# Patient Record
Sex: FEMALE | Race: WHITE | Employment: PART TIME | URBAN - METROPOLITAN AREA
[De-identification: names, ages, dates, MRNs, and addresses within clinical notes are randomized per-mention and may not be internally consistent; named-entity substitution may affect disease eponyms.]

---
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
## 2024-12-30 ENCOUNTER — OFFICE VISIT (OUTPATIENT)
Dept: INTERNAL MEDICINE | Age: 19
End: 2024-12-30

## 2024-12-30 VITALS
TEMPERATURE: 98 F | BODY MASS INDEX: 31.23 KG/M2 | OXYGEN SATURATION: 99 % | RESPIRATION RATE: 16 BRPM | HEIGHT: 67 IN | WEIGHT: 199 LBS | SYSTOLIC BLOOD PRESSURE: 116 MMHG | HEART RATE: 75 BPM | DIASTOLIC BLOOD PRESSURE: 74 MMHG

## 2024-12-30 DIAGNOSIS — G43.009 MIGRAINE WITHOUT AURA AND WITHOUT STATUS MIGRAINOSUS, NOT INTRACTABLE: ICD-10-CM

## 2024-12-30 DIAGNOSIS — R35.0 URINARY FREQUENCY: Primary | ICD-10-CM

## 2024-12-30 DIAGNOSIS — N94.6 DYSMENORRHEA: ICD-10-CM

## 2024-12-30 DIAGNOSIS — F32.A DEPRESSION, UNSPECIFIED DEPRESSION TYPE: ICD-10-CM

## 2024-12-30 PROBLEM — N91.1 SECONDARY AMENORRHEA: Status: ACTIVE | Noted: 2024-09-10

## 2024-12-30 PROBLEM — F41.9 ANXIETY: Status: ACTIVE | Noted: 2021-06-15

## 2024-12-30 PROBLEM — R42 DIZZINESS: Status: RESOLVED | Noted: 2021-02-02 | Resolved: 2024-12-30

## 2024-12-30 PROBLEM — K21.9 GASTROESOPHAGEAL REFLUX DISEASE: Status: ACTIVE | Noted: 2021-02-02

## 2024-12-30 PROBLEM — G89.29 CHRONIC BILATERAL LOW BACK PAIN WITHOUT SCIATICA: Status: ACTIVE | Noted: 2019-10-28

## 2024-12-30 PROBLEM — J45.20 INTERMITTENT ASTHMA WITHOUT COMPLICATION: Status: ACTIVE | Noted: 2024-12-30

## 2024-12-30 PROBLEM — M54.50 CHRONIC BILATERAL LOW BACK PAIN WITHOUT SCIATICA: Status: ACTIVE | Noted: 2019-10-28

## 2024-12-30 PROBLEM — R42 DIZZINESS: Status: ACTIVE | Noted: 2021-02-02

## 2024-12-30 PROBLEM — H53.002 AMBLYOPIA, LEFT EYE: Status: ACTIVE | Noted: 2023-08-07

## 2024-12-30 PROBLEM — N39.0 FREQUENT UTI: Status: ACTIVE | Noted: 2021-06-15

## 2024-12-30 PROBLEM — K80.50 BILIARY COLIC: Status: ACTIVE | Noted: 2023-05-22

## 2024-12-30 LAB
BILIRUBIN, POC: NEGATIVE
BLOOD URINE, POC: NEGATIVE
CLARITY, POC: NORMAL
COLOR, POC: YELLOW
CONTROL: NORMAL
GLUCOSE URINE, POC: NEGATIVE MG/DL
KETONES, POC: NEGATIVE MG/DL
LEUKOCYTE EST, POC: NEGATIVE
NITRITE, POC: NEGATIVE
PH, POC: 6
PREGNANCY TEST URINE, POC: NEGATIVE
PROTEIN, POC: NEGATIVE MG/DL
SPECIFIC GRAVITY, POC: 1.01
UROBILINOGEN, POC: NEGATIVE MG/DL

## 2024-12-30 PROCEDURE — 81025 URINE PREGNANCY TEST: CPT | Performed by: STUDENT IN AN ORGANIZED HEALTH CARE EDUCATION/TRAINING PROGRAM

## 2024-12-30 PROCEDURE — 81003 URINALYSIS AUTO W/O SCOPE: CPT | Performed by: STUDENT IN AN ORGANIZED HEALTH CARE EDUCATION/TRAINING PROGRAM

## 2024-12-30 PROCEDURE — 99204 OFFICE O/P NEW MOD 45 MIN: CPT | Performed by: STUDENT IN AN ORGANIZED HEALTH CARE EDUCATION/TRAINING PROGRAM

## 2024-12-30 RX ORDER — ACETAMINOPHEN AND CODEINE PHOSPHATE 120; 12 MG/5ML; MG/5ML
0.35 SOLUTION ORAL DAILY
COMMUNITY
Start: 2024-05-07

## 2024-12-30 RX ORDER — METHYLPHENIDATE HYDROCHLORIDE 36 MG/1
36 TABLET, EXTENDED RELEASE ORAL DAILY
COMMUNITY
Start: 2024-06-19

## 2024-12-30 RX ORDER — NORTRIPTYLINE HYDROCHLORIDE 25 MG/1
25 CAPSULE ORAL NIGHTLY
COMMUNITY
Start: 2024-05-08

## 2024-12-30 RX ORDER — PANTOPRAZOLE SODIUM 40 MG/1
40 TABLET, DELAYED RELEASE ORAL 2 TIMES DAILY
COMMUNITY
Start: 2024-10-31

## 2024-12-30 RX ORDER — RIZATRIPTAN BENZOATE 10 MG/1
10 TABLET ORAL PRN
COMMUNITY
Start: 2024-05-08

## 2024-12-30 SDOH — ECONOMIC STABILITY: INCOME INSECURITY: HOW HARD IS IT FOR YOU TO PAY FOR THE VERY BASICS LIKE FOOD, HOUSING, MEDICAL CARE, AND HEATING?: PATIENT DECLINED

## 2024-12-30 ASSESSMENT — PATIENT HEALTH QUESTIONNAIRE - PHQ9
10. IF YOU CHECKED OFF ANY PROBLEMS, HOW DIFFICULT HAVE THESE PROBLEMS MADE IT FOR YOU TO DO YOUR WORK, TAKE CARE OF THINGS AT HOME, OR GET ALONG WITH OTHER PEOPLE: VERY DIFFICULT
SUM OF ALL RESPONSES TO PHQ QUESTIONS 1-9: 15
SUM OF ALL RESPONSES TO PHQ QUESTIONS 1-9: 15
8. MOVING OR SPEAKING SO SLOWLY THAT OTHER PEOPLE COULD HAVE NOTICED. OR THE OPPOSITE, BEING SO FIGETY OR RESTLESS THAT YOU HAVE BEEN MOVING AROUND A LOT MORE THAN USUAL: NEARLY EVERY DAY
7. TROUBLE CONCENTRATING ON THINGS, SUCH AS READING THE NEWSPAPER OR WATCHING TELEVISION: MORE THAN HALF THE DAYS
6. FEELING BAD ABOUT YOURSELF - OR THAT YOU ARE A FAILURE OR HAVE LET YOURSELF OR YOUR FAMILY DOWN: NEARLY EVERY DAY
9. THOUGHTS THAT YOU WOULD BE BETTER OFF DEAD, OR OF HURTING YOURSELF: NOT AT ALL
SUM OF ALL RESPONSES TO PHQ QUESTIONS 1-9: 15
3. TROUBLE FALLING OR STAYING ASLEEP: NEARLY EVERY DAY
2. FEELING DOWN, DEPRESSED OR HOPELESS: SEVERAL DAYS
4. FEELING TIRED OR HAVING LITTLE ENERGY: SEVERAL DAYS
5. POOR APPETITE OR OVEREATING: MORE THAN HALF THE DAYS
SUM OF ALL RESPONSES TO PHQ QUESTIONS 1-9: 15

## 2024-12-30 ASSESSMENT — ANXIETY QUESTIONNAIRES
GAD7 TOTAL SCORE: 20
2. NOT BEING ABLE TO STOP OR CONTROL WORRYING: NEARLY EVERY DAY
6. BECOMING EASILY ANNOYED OR IRRITABLE: MORE THAN HALF THE DAYS
5. BEING SO RESTLESS THAT IT IS HARD TO SIT STILL: NEARLY EVERY DAY
IF YOU CHECKED OFF ANY PROBLEMS ON THIS QUESTIONNAIRE, HOW DIFFICULT HAVE THESE PROBLEMS MADE IT FOR YOU TO DO YOUR WORK, TAKE CARE OF THINGS AT HOME, OR GET ALONG WITH OTHER PEOPLE: EXTREMELY DIFFICULT
3. WORRYING TOO MUCH ABOUT DIFFERENT THINGS: NEARLY EVERY DAY
1. FEELING NERVOUS, ANXIOUS, OR ON EDGE: NEARLY EVERY DAY
4. TROUBLE RELAXING: NEARLY EVERY DAY
7. FEELING AFRAID AS IF SOMETHING AWFUL MIGHT HAPPEN: NEARLY EVERY DAY

## 2024-12-30 NOTE — PATIENT INSTRUCTIONS
the uninsured and underinsured. From providing quality care to connecting patients to critical community resources, our patients and their needs are our highest priority.  Phone number: [phone]  Website: https://[employer]        Utility:                    211:  What they offer: Help find lower cost options for phone or internet as well as help paying utility bills.   Phone Number: 211  Website: https://wwwCoverPage Publishingorg/get-help/utilities-expenses                 Aiden Purdy  What they offer:  Assistance with utilities  Phone:  226.281.7086  Website: https://www.Diaspora.PinMyPet/usn/                         [facility]   What they Offer: Assistance with utilities  Phone: 648.893.6124  Website: https://www.pickrset."[employer]/     [facility] [place]  What they Offer: Assistance with utilities  Phone: 305.389.5030  Website: https://[employer].PinMyPet/        MEDICATIONS:   Good Rx  What they offer: Good Rx tracks prescription drug prices and provides free drug coupons for discounts on medications.  Website: https://www.VISUAL NACERT/     [employer]:  What they offer: [facility] offers free information on medications and healthcare cost savings programs including prescription assistance programs, coupons, and discount programs.  Helpline: 985.837.4988  Website: https://www.Proacta.org     RX Assist:  What they offer: Medication assistance  Website: https://www.rxassist.org/patient assistance programs for medication     [facility] $4 Prescription Program:  What they offer: Prescription Program includes up to a 30-day supply for $4 and a 90-day supply for $10 of some covered generic drugs at commonly prescribed dosages  Website: https://www.A V.E.T.S.c.a.r.e./cp/4-prescriptions/4465291     [facility]:  Phone Number: 671.210.5000        Breese [employer]*  (Call 211 if need more resources.)     SNAP:  What they offer:  Helps low-income adults and families stretch their monthly

## 2024-12-30 NOTE — ASSESSMENT & PLAN NOTE
POC UA negative today. Potentially in setting of increased water intake  Pt to reach out to me if she has additional UTI sx

## 2024-12-30 NOTE — PROGRESS NOTES
[facility]  Formerly [facility]   [address] [place], [place] [id number]   P: [phone]      Smiley Fay (:  2005) is a 19 y.o. female,New patient, here for evaluation of the following chief complaint(s):  Establish Care (Pt here to establish care today. Pt also has work physical form. ) and Urinary Tract Infection (On/off frequency for few months. )      Assessment & Plan   ASSESSMENT/PLAN:  1. Urinary frequency  Assessment & Plan:  POC UA negative today. Potentially in setting of increased water intake  Pt to reach out to me if she has additional UTI sx   Orders:  -     POCT Urinalysis No Micro (Auto)  -     POCT urine pregnancy  2. Migraine without aura and without status migrainosus, not intractable  Assessment & Plan:  Requesting referral to Neurology   Orders:  -     Luis Antonio August MD, Neurology, [place]  3. Dysmenorrhea  Assessment & Plan:  Referral to OB/GYN  Orders:  -     Ambulatory referral to Obstetrics / Gynecology  4. Depression, unspecified depression type  Assessment & Plan:  Mood currently stable  She suspects BPD  Resources for mental health centers given      Results for orders placed or performed in visit on 24   POCT Urinalysis No Micro (Auto)   Result Value Ref Range    Color, UA yellow     Clarity, UA cloudy     Glucose, UA POC negative mg/dL    Bilirubin, UA negative     Ketones, UA negative mg/dL    Spec Grav, UA 1.015     Blood, UA POC negative     pH, UA 6     Protein, UA POC negative mg/dL    Urobilinogen, UA negative mg/dL    Leukocytes, UA negative     Nitrite, UA negative    POCT urine pregnancy   Result Value Ref Range    Preg Test, Ur negative Negative    Control pass         No follow-ups on file.         Subjective   SUBJECTIVE/OBJECTIVE:  HPI    Ms. Smiley Fya is a 18 yo female with pmh of intermittent asthma, GERD, migraine, UTI, anxiety, back pain, depression who presents to establish care and with concerns for urinary